# Patient Record
Sex: MALE | HISPANIC OR LATINO | URBAN - METROPOLITAN AREA
[De-identification: names, ages, dates, MRNs, and addresses within clinical notes are randomized per-mention and may not be internally consistent; named-entity substitution may affect disease eponyms.]

---

## 2017-05-10 ENCOUNTER — EMERGENCY (EMERGENCY)
Facility: HOSPITAL | Age: 32
LOS: 1 days | Discharge: HOME | End: 2017-05-10
Attending: EMERGENCY MEDICINE
Payer: COMMERCIAL

## 2017-05-10 VITALS
HEART RATE: 70 BPM | RESPIRATION RATE: 14 BRPM | OXYGEN SATURATION: 9 % | DIASTOLIC BLOOD PRESSURE: 79 MMHG | SYSTOLIC BLOOD PRESSURE: 129 MMHG

## 2017-05-10 VITALS
OXYGEN SATURATION: 98 % | HEIGHT: 63 IN | HEART RATE: 65 BPM | DIASTOLIC BLOOD PRESSURE: 70 MMHG | SYSTOLIC BLOOD PRESSURE: 136 MMHG | RESPIRATION RATE: 16 BRPM | WEIGHT: 139.99 LBS | TEMPERATURE: 99 F

## 2017-05-10 LAB
ACETONE SERPL-MCNC: NEGATIVE — SIGNIFICANT CHANGE UP
ALBUMIN SERPL ELPH-MCNC: 4 G/DL — SIGNIFICANT CHANGE UP (ref 3.5–5)
ALP SERPL-CCNC: 52 U/L — SIGNIFICANT CHANGE UP (ref 40–120)
ALT FLD-CCNC: 20 U/L DA — SIGNIFICANT CHANGE UP (ref 10–60)
ANION GAP SERPL CALC-SCNC: 6 MMOL/L — SIGNIFICANT CHANGE UP (ref 5–17)
APTT BLD: 32.7 SEC — SIGNIFICANT CHANGE UP (ref 27.5–37.4)
AST SERPL-CCNC: 22 U/L — SIGNIFICANT CHANGE UP (ref 10–40)
BASOPHILS NFR BLD AUTO: 2 % — SIGNIFICANT CHANGE UP (ref 0–2)
BILIRUB SERPL-MCNC: 0.3 MG/DL — SIGNIFICANT CHANGE UP (ref 0.2–1.2)
BUN SERPL-MCNC: 13 MG/DL — SIGNIFICANT CHANGE UP (ref 7–18)
CALCIUM SERPL-MCNC: 8.7 MG/DL — SIGNIFICANT CHANGE UP (ref 8.4–10.5)
CHLORIDE SERPL-SCNC: 106 MMOL/L — SIGNIFICANT CHANGE UP (ref 96–108)
CO2 SERPL-SCNC: 29 MMOL/L — SIGNIFICANT CHANGE UP (ref 22–31)
CREAT SERPL-MCNC: 0.84 MG/DL — SIGNIFICANT CHANGE UP (ref 0.5–1.3)
EOSINOPHIL NFR BLD AUTO: 5 % — SIGNIFICANT CHANGE UP (ref 0–6)
GLUCOSE SERPL-MCNC: 80 MG/DL — SIGNIFICANT CHANGE UP (ref 70–99)
HCT VFR BLD CALC: 46.1 % — SIGNIFICANT CHANGE UP (ref 39–50)
HGB BLD-MCNC: 16.2 G/DL — SIGNIFICANT CHANGE UP (ref 13–17)
INR BLD: 1.12 RATIO — SIGNIFICANT CHANGE UP (ref 0.88–1.16)
LACTATE SERPL-SCNC: 0.5 MMOL/L — LOW (ref 0.7–2)
LYMPHOCYTES # BLD AUTO: 37 % — SIGNIFICANT CHANGE UP (ref 13–44)
MAGNESIUM SERPL-MCNC: 2.2 MG/DL — SIGNIFICANT CHANGE UP (ref 1.8–2.4)
MANUAL DIF COMMENT BLD-IMP: SIGNIFICANT CHANGE UP
MCHC RBC-ENTMCNC: 31.5 PG — SIGNIFICANT CHANGE UP (ref 27–34)
MCHC RBC-ENTMCNC: 35.1 GM/DL — SIGNIFICANT CHANGE UP (ref 32–36)
MCV RBC AUTO: 89.8 FL — SIGNIFICANT CHANGE UP (ref 80–100)
MONOCYTES NFR BLD AUTO: 10 % — SIGNIFICANT CHANGE UP (ref 2–14)
NEUTROPHILS NFR BLD AUTO: 45 % — SIGNIFICANT CHANGE UP (ref 43–77)
PLAT MORPH BLD: NORMAL — SIGNIFICANT CHANGE UP
PLATELET # BLD AUTO: 252 K/UL — SIGNIFICANT CHANGE UP (ref 150–400)
POTASSIUM SERPL-MCNC: 3.7 MMOL/L — SIGNIFICANT CHANGE UP (ref 3.5–5.3)
POTASSIUM SERPL-SCNC: 3.7 MMOL/L — SIGNIFICANT CHANGE UP (ref 3.5–5.3)
PROT SERPL-MCNC: 7.8 G/DL — SIGNIFICANT CHANGE UP (ref 6–8.3)
PROTHROM AB SERPL-ACNC: 12.2 SEC — SIGNIFICANT CHANGE UP (ref 9.8–12.7)
RBC # BLD: 5.13 M/UL — SIGNIFICANT CHANGE UP (ref 4.2–5.8)
RBC # FLD: 11 % — SIGNIFICANT CHANGE UP (ref 10.3–14.5)
RBC BLD AUTO: NORMAL — SIGNIFICANT CHANGE UP
SODIUM SERPL-SCNC: 141 MMOL/L — SIGNIFICANT CHANGE UP (ref 135–145)
VARIANT LYMPHS # BLD: 1 % — SIGNIFICANT CHANGE UP (ref 0–6)
WBC # BLD: 6.4 K/UL — SIGNIFICANT CHANGE UP (ref 3.8–10.5)
WBC # FLD AUTO: 6.4 K/UL — SIGNIFICANT CHANGE UP (ref 3.8–10.5)

## 2017-05-10 PROCEDURE — 94640 AIRWAY INHALATION TREATMENT: CPT

## 2017-05-10 PROCEDURE — 83605 ASSAY OF LACTIC ACID: CPT

## 2017-05-10 PROCEDURE — 85027 COMPLETE CBC AUTOMATED: CPT

## 2017-05-10 PROCEDURE — 83735 ASSAY OF MAGNESIUM: CPT

## 2017-05-10 PROCEDURE — 82009 KETONE BODYS QUAL: CPT

## 2017-05-10 PROCEDURE — 99285 EMERGENCY DEPT VISIT HI MDM: CPT

## 2017-05-10 PROCEDURE — 85730 THROMBOPLASTIN TIME PARTIAL: CPT

## 2017-05-10 PROCEDURE — 99284 EMERGENCY DEPT VISIT MOD MDM: CPT | Mod: 25

## 2017-05-10 PROCEDURE — 87040 BLOOD CULTURE FOR BACTERIA: CPT

## 2017-05-10 PROCEDURE — 71020: CPT | Mod: 26

## 2017-05-10 PROCEDURE — 71046 X-RAY EXAM CHEST 2 VIEWS: CPT

## 2017-05-10 PROCEDURE — 96374 THER/PROPH/DIAG INJ IV PUSH: CPT

## 2017-05-10 PROCEDURE — 80053 COMPREHEN METABOLIC PANEL: CPT

## 2017-05-10 PROCEDURE — 96375 TX/PRO/DX INJ NEW DRUG ADDON: CPT

## 2017-05-10 PROCEDURE — 85610 PROTHROMBIN TIME: CPT

## 2017-05-10 RX ORDER — ALBUTEROL 90 UG/1
2.5 AEROSOL, METERED ORAL ONCE
Qty: 0 | Refills: 0 | Status: COMPLETED | OUTPATIENT
Start: 2017-05-10 | End: 2017-05-10

## 2017-05-10 RX ORDER — SODIUM CHLORIDE 9 MG/ML
2000 INJECTION INTRAMUSCULAR; INTRAVENOUS; SUBCUTANEOUS ONCE
Qty: 0 | Refills: 0 | Status: COMPLETED | OUTPATIENT
Start: 2017-05-10 | End: 2017-05-10

## 2017-05-10 RX ORDER — KETOROLAC TROMETHAMINE 30 MG/ML
30 SYRINGE (ML) INJECTION ONCE
Qty: 0 | Refills: 0 | Status: DISCONTINUED | OUTPATIENT
Start: 2017-05-10 | End: 2017-05-10

## 2017-05-10 RX ORDER — IBUPROFEN 200 MG
1 TABLET ORAL
Qty: 21 | Refills: 0 | OUTPATIENT
Start: 2017-05-10 | End: 2017-05-17

## 2017-05-10 RX ADMIN — Medication 125 MILLIGRAM(S): at 16:56

## 2017-05-10 RX ADMIN — Medication 30 MILLIGRAM(S): at 16:56

## 2017-05-10 RX ADMIN — SODIUM CHLORIDE 4000 MILLILITER(S): 9 INJECTION INTRAMUSCULAR; INTRAVENOUS; SUBCUTANEOUS at 16:16

## 2017-05-10 RX ADMIN — ALBUTEROL 2.5 MILLIGRAM(S): 90 AEROSOL, METERED ORAL at 16:57

## 2017-05-10 NOTE — ED PROVIDER NOTE - OBJECTIVE STATEMENT
30 y/o male with no PMHx presents to the ED c/o onset generalized weakness and cough x 4 days. Pt states that he was at work 4 days ago he started to having sneezing and mild fever and then yesterday developed coughing with productive yellowish sputum. Pt states that his fever has improved, but this morning, pt notes coughing up blood clot x 2 episodes. Pt took Theraflu to no relief (last does: last night). Pt then felt mild SOB, notes that he heard "whistling noise" with cough since onset. Pt denies fever, chills. nasal congestion, nausea, vomiting, recent travel, or any other complaints. NKDA. Pt is a light marijuana smoker. Pt has not gotten flu shot this year.

## 2017-05-10 NOTE — ED PROVIDER NOTE - NS ED MD SCRIBE ATTENDING SCRIBE SECTIONS
HIV/DISPOSITION/REVIEW OF SYSTEMS/HISTORY OF PRESENT ILLNESS/PAST MEDICAL/SURGICAL/SOCIAL HISTORY/PHYSICAL EXAM/VITAL SIGNS( Pullset)

## 2017-05-10 NOTE — ED ADULT TRIAGE NOTE - CHIEF COMPLAINT QUOTE
C/o I have a fever, dizziness, no energy. sneezing and I coughed up a large blood clot this morning.

## 2017-05-14 DIAGNOSIS — R05 COUGH: ICD-10-CM

## 2017-05-14 DIAGNOSIS — R53.1 WEAKNESS: ICD-10-CM

## 2017-05-14 DIAGNOSIS — F12.10 CANNABIS ABUSE, UNCOMPLICATED: ICD-10-CM

## 2017-05-14 DIAGNOSIS — B34.9 VIRAL INFECTION, UNSPECIFIED: ICD-10-CM

## 2017-05-16 LAB
CULTURE RESULTS: SIGNIFICANT CHANGE UP
CULTURE RESULTS: SIGNIFICANT CHANGE UP
SPECIMEN SOURCE: SIGNIFICANT CHANGE UP
SPECIMEN SOURCE: SIGNIFICANT CHANGE UP

## 2017-10-31 ENCOUNTER — INPATIENT (INPATIENT)
Facility: HOSPITAL | Age: 32
LOS: 1 days | Discharge: HOME | DRG: 340 | End: 2017-11-02
Attending: SURGERY | Admitting: SURGERY
Payer: SELF-PAY

## 2017-10-31 VITALS
RESPIRATION RATE: 18 BRPM | HEART RATE: 52 BPM | HEIGHT: 65 IN | DIASTOLIC BLOOD PRESSURE: 81 MMHG | TEMPERATURE: 98 F | WEIGHT: 160.06 LBS | OXYGEN SATURATION: 98 % | SYSTOLIC BLOOD PRESSURE: 135 MMHG

## 2017-10-31 LAB
ALBUMIN SERPL ELPH-MCNC: 3.9 G/DL — SIGNIFICANT CHANGE UP (ref 3.5–5)
ALP SERPL-CCNC: 46 U/L — SIGNIFICANT CHANGE UP (ref 40–120)
ALT FLD-CCNC: 22 U/L DA — SIGNIFICANT CHANGE UP (ref 10–60)
ANION GAP SERPL CALC-SCNC: 5 MMOL/L — SIGNIFICANT CHANGE UP (ref 5–17)
APPEARANCE UR: CLEAR — SIGNIFICANT CHANGE UP
AST SERPL-CCNC: 26 U/L — SIGNIFICANT CHANGE UP (ref 10–40)
BASOPHILS # BLD AUTO: 0.1 K/UL — SIGNIFICANT CHANGE UP (ref 0–0.2)
BASOPHILS NFR BLD AUTO: 1.5 % — SIGNIFICANT CHANGE UP (ref 0–2)
BILIRUB SERPL-MCNC: 0.3 MG/DL — SIGNIFICANT CHANGE UP (ref 0.2–1.2)
BILIRUB UR-MCNC: NEGATIVE — SIGNIFICANT CHANGE UP
BUN SERPL-MCNC: 16 MG/DL — SIGNIFICANT CHANGE UP (ref 7–18)
CALCIUM SERPL-MCNC: 8.7 MG/DL — SIGNIFICANT CHANGE UP (ref 8.4–10.5)
CHLORIDE SERPL-SCNC: 105 MMOL/L — SIGNIFICANT CHANGE UP (ref 96–108)
CO2 SERPL-SCNC: 29 MMOL/L — SIGNIFICANT CHANGE UP (ref 22–31)
COLOR SPEC: YELLOW — SIGNIFICANT CHANGE UP
CREAT SERPL-MCNC: 0.82 MG/DL — SIGNIFICANT CHANGE UP (ref 0.5–1.3)
DIFF PNL FLD: ABNORMAL
EOSINOPHIL # BLD AUTO: 0.2 K/UL — SIGNIFICANT CHANGE UP (ref 0–0.5)
EOSINOPHIL NFR BLD AUTO: 1.6 % — SIGNIFICANT CHANGE UP (ref 0–6)
GLUCOSE SERPL-MCNC: 88 MG/DL — SIGNIFICANT CHANGE UP (ref 70–99)
GLUCOSE UR QL: NEGATIVE — SIGNIFICANT CHANGE UP
HCT VFR BLD CALC: 47.6 % — SIGNIFICANT CHANGE UP (ref 39–50)
HGB BLD-MCNC: 16.2 G/DL — SIGNIFICANT CHANGE UP (ref 13–17)
KETONES UR-MCNC: NEGATIVE — SIGNIFICANT CHANGE UP
LEUKOCYTE ESTERASE UR-ACNC: NEGATIVE — SIGNIFICANT CHANGE UP
LIDOCAIN IGE QN: 95 U/L — SIGNIFICANT CHANGE UP (ref 73–393)
LYMPHOCYTES # BLD AUTO: 1.6 K/UL — SIGNIFICANT CHANGE UP (ref 1–3.3)
LYMPHOCYTES # BLD AUTO: 16.5 % — SIGNIFICANT CHANGE UP (ref 13–44)
MCHC RBC-ENTMCNC: 31.8 PG — SIGNIFICANT CHANGE UP (ref 27–34)
MCHC RBC-ENTMCNC: 34 GM/DL — SIGNIFICANT CHANGE UP (ref 32–36)
MCV RBC AUTO: 93.5 FL — SIGNIFICANT CHANGE UP (ref 80–100)
MONOCYTES # BLD AUTO: 1 K/UL — HIGH (ref 0–0.9)
MONOCYTES NFR BLD AUTO: 10.2 % — SIGNIFICANT CHANGE UP (ref 2–14)
NEUTROPHILS # BLD AUTO: 6.9 K/UL — SIGNIFICANT CHANGE UP (ref 1.8–7.4)
NEUTROPHILS NFR BLD AUTO: 70.2 % — SIGNIFICANT CHANGE UP (ref 43–77)
NITRITE UR-MCNC: NEGATIVE — SIGNIFICANT CHANGE UP
PH UR: 6 — SIGNIFICANT CHANGE UP (ref 5–8)
PLATELET # BLD AUTO: 273 K/UL — SIGNIFICANT CHANGE UP (ref 150–400)
POTASSIUM SERPL-MCNC: 3.9 MMOL/L — SIGNIFICANT CHANGE UP (ref 3.5–5.3)
POTASSIUM SERPL-SCNC: 3.9 MMOL/L — SIGNIFICANT CHANGE UP (ref 3.5–5.3)
PROT SERPL-MCNC: 7.6 G/DL — SIGNIFICANT CHANGE UP (ref 6–8.3)
PROT UR-MCNC: NEGATIVE — SIGNIFICANT CHANGE UP
RBC # BLD: 5.09 M/UL — SIGNIFICANT CHANGE UP (ref 4.2–5.8)
RBC # FLD: 11.5 % — SIGNIFICANT CHANGE UP (ref 10.3–14.5)
SODIUM SERPL-SCNC: 139 MMOL/L — SIGNIFICANT CHANGE UP (ref 135–145)
SP GR SPEC: 1.02 — SIGNIFICANT CHANGE UP (ref 1.01–1.02)
UROBILINOGEN FLD QL: 4
WBC # BLD: 9.8 K/UL — SIGNIFICANT CHANGE UP (ref 3.8–10.5)
WBC # FLD AUTO: 9.8 K/UL — SIGNIFICANT CHANGE UP (ref 3.8–10.5)

## 2017-10-31 RX ORDER — SODIUM CHLORIDE 9 MG/ML
1000 INJECTION INTRAMUSCULAR; INTRAVENOUS; SUBCUTANEOUS ONCE
Qty: 0 | Refills: 0 | Status: COMPLETED | OUTPATIENT
Start: 2017-10-31 | End: 2017-10-31

## 2017-10-31 RX ORDER — ONDANSETRON 8 MG/1
4 TABLET, FILM COATED ORAL ONCE
Qty: 0 | Refills: 0 | Status: COMPLETED | OUTPATIENT
Start: 2017-10-31 | End: 2017-10-31

## 2017-10-31 RX ORDER — SODIUM CHLORIDE 9 MG/ML
3 INJECTION INTRAMUSCULAR; INTRAVENOUS; SUBCUTANEOUS ONCE
Qty: 0 | Refills: 0 | Status: COMPLETED | OUTPATIENT
Start: 2017-10-31 | End: 2017-10-31

## 2017-10-31 RX ADMIN — SODIUM CHLORIDE 2000 MILLILITER(S): 9 INJECTION INTRAMUSCULAR; INTRAVENOUS; SUBCUTANEOUS at 22:37

## 2017-10-31 RX ADMIN — SODIUM CHLORIDE 1000 MILLILITER(S): 9 INJECTION INTRAMUSCULAR; INTRAVENOUS; SUBCUTANEOUS at 21:11

## 2017-10-31 RX ADMIN — ONDANSETRON 4 MILLIGRAM(S): 8 TABLET, FILM COATED ORAL at 22:37

## 2017-10-31 RX ADMIN — SODIUM CHLORIDE 3 MILLILITER(S): 9 INJECTION INTRAMUSCULAR; INTRAVENOUS; SUBCUTANEOUS at 21:10

## 2017-10-31 NOTE — ED ADULT NURSE NOTE - OBJECTIVE STATEMENT
33 y/o M pt with no significant PMHx or PSHx presented to the ED c/o nausea and abd pain for the last 2 days. Pt reported that he went to [facility]; upon discovery of RLQ pain, physician instructed him to report to ED query appendicitis. P/S 0-9/10.  Pt denies any other complaints

## 2017-10-31 NOTE — ED PROVIDER NOTE - OBJECTIVE STATEMENT
33 y/o M pt with no significant PMHx or PSHx presented to the ED c/o nausea and abd pain for the last 2 days. Pt reported that he went to [facility]; upon discovery of RLQ pain, physician instructed him to report to ED. Pt denies any other complaints. NKDA.

## 2017-10-31 NOTE — ED PROVIDER NOTE - MEDICAL DECISION MAKING DETAILS
Patient with RLQ pain, will get labs, CT abdomen/pelvis. ddx: appendicitis, colitis, diverticulitis, bowel obstruction

## 2017-11-01 ENCOUNTER — TRANSCRIPTION ENCOUNTER (OUTPATIENT)
Age: 32
End: 2017-11-01

## 2017-11-01 ENCOUNTER — RESULT REVIEW (OUTPATIENT)
Age: 32
End: 2017-11-01

## 2017-11-01 DIAGNOSIS — K35.89 OTHER ACUTE APPENDICITIS: ICD-10-CM

## 2017-11-01 DIAGNOSIS — K37 UNSPECIFIED APPENDICITIS: ICD-10-CM

## 2017-11-01 DIAGNOSIS — K35.80 UNSPECIFIED ACUTE APPENDICITIS: ICD-10-CM

## 2017-11-01 LAB
ANION GAP SERPL CALC-SCNC: 8 MMOL/L — SIGNIFICANT CHANGE UP (ref 5–17)
BLD GP AB SCN SERPL QL: SIGNIFICANT CHANGE UP
BUN SERPL-MCNC: 12 MG/DL — SIGNIFICANT CHANGE UP (ref 7–18)
CALCIUM SERPL-MCNC: 8 MG/DL — LOW (ref 8.4–10.5)
CHLORIDE SERPL-SCNC: 107 MMOL/L — SIGNIFICANT CHANGE UP (ref 96–108)
CO2 SERPL-SCNC: 25 MMOL/L — SIGNIFICANT CHANGE UP (ref 22–31)
CREAT SERPL-MCNC: 0.86 MG/DL — SIGNIFICANT CHANGE UP (ref 0.5–1.3)
GLUCOSE SERPL-MCNC: 94 MG/DL — SIGNIFICANT CHANGE UP (ref 70–99)
INR BLD: 1.06 RATIO — SIGNIFICANT CHANGE UP (ref 0.88–1.16)
POTASSIUM SERPL-MCNC: 3.6 MMOL/L — SIGNIFICANT CHANGE UP (ref 3.5–5.3)
POTASSIUM SERPL-SCNC: 3.6 MMOL/L — SIGNIFICANT CHANGE UP (ref 3.5–5.3)
PROTHROM AB SERPL-ACNC: 11.6 SEC — SIGNIFICANT CHANGE UP (ref 9.8–12.7)
SODIUM SERPL-SCNC: 140 MMOL/L — SIGNIFICANT CHANGE UP (ref 135–145)

## 2017-11-01 PROCEDURE — 99223 1ST HOSP IP/OBS HIGH 75: CPT | Mod: 57

## 2017-11-01 PROCEDURE — 99285 EMERGENCY DEPT VISIT HI MDM: CPT | Mod: 25

## 2017-11-01 PROCEDURE — 88304 TISSUE EXAM BY PATHOLOGIST: CPT | Mod: 26

## 2017-11-01 PROCEDURE — 44970 LAPAROSCOPY APPENDECTOMY: CPT

## 2017-11-01 PROCEDURE — 99053 MED SERV 10PM-8AM 24 HR FAC: CPT

## 2017-11-01 PROCEDURE — 44970 LAPAROSCOPY APPENDECTOMY: CPT | Mod: AS

## 2017-11-01 PROCEDURE — 74177 CT ABD & PELVIS W/CONTRAST: CPT | Mod: 26

## 2017-11-01 RX ORDER — DEXTROSE MONOHYDRATE, SODIUM CHLORIDE, AND POTASSIUM CHLORIDE 50; .745; 4.5 G/1000ML; G/1000ML; G/1000ML
1000 INJECTION, SOLUTION INTRAVENOUS
Qty: 0 | Refills: 0 | Status: DISCONTINUED | OUTPATIENT
Start: 2017-11-01 | End: 2017-11-01

## 2017-11-01 RX ORDER — HEPARIN SODIUM 5000 [USP'U]/ML
5000 INJECTION INTRAVENOUS; SUBCUTANEOUS EVERY 8 HOURS
Qty: 0 | Refills: 0 | Status: DISCONTINUED | OUTPATIENT
Start: 2017-11-01 | End: 2017-11-02

## 2017-11-01 RX ORDER — CEFOTETAN DISODIUM 1 G
1 VIAL (EA) INJECTION ONCE
Qty: 0 | Refills: 0 | Status: COMPLETED | OUTPATIENT
Start: 2017-11-01 | End: 2017-11-01

## 2017-11-01 RX ORDER — MORPHINE SULFATE 50 MG/1
2 CAPSULE, EXTENDED RELEASE ORAL EVERY 4 HOURS
Qty: 0 | Refills: 0 | Status: DISCONTINUED | OUTPATIENT
Start: 2017-11-01 | End: 2017-11-01

## 2017-11-01 RX ORDER — ACETAMINOPHEN 500 MG
650 TABLET ORAL EVERY 6 HOURS
Qty: 0 | Refills: 0 | Status: DISCONTINUED | OUTPATIENT
Start: 2017-11-01 | End: 2017-11-02

## 2017-11-01 RX ORDER — ONDANSETRON 8 MG/1
4 TABLET, FILM COATED ORAL EVERY 6 HOURS
Qty: 0 | Refills: 0 | Status: DISCONTINUED | OUTPATIENT
Start: 2017-11-01 | End: 2017-11-01

## 2017-11-01 RX ORDER — SODIUM CHLORIDE 9 MG/ML
1000 INJECTION, SOLUTION INTRAVENOUS
Qty: 0 | Refills: 0 | Status: DISCONTINUED | OUTPATIENT
Start: 2017-11-01 | End: 2017-11-02

## 2017-11-01 RX ORDER — KETOROLAC TROMETHAMINE 30 MG/ML
30 SYRINGE (ML) INJECTION EVERY 6 HOURS
Qty: 0 | Refills: 0 | Status: DISCONTINUED | OUTPATIENT
Start: 2017-11-01 | End: 2017-11-01

## 2017-11-01 RX ORDER — MORPHINE SULFATE 50 MG/1
4 CAPSULE, EXTENDED RELEASE ORAL EVERY 4 HOURS
Qty: 0 | Refills: 0 | Status: DISCONTINUED | OUTPATIENT
Start: 2017-11-01 | End: 2017-11-02

## 2017-11-01 RX ORDER — CEFOTETAN DISODIUM 1 G
1 VIAL (EA) INJECTION EVERY 12 HOURS
Qty: 0 | Refills: 0 | Status: DISCONTINUED | OUTPATIENT
Start: 2017-11-01 | End: 2017-11-01

## 2017-11-01 RX ORDER — OXYCODONE AND ACETAMINOPHEN 5; 325 MG/1; MG/1
1 TABLET ORAL EVERY 4 HOURS
Qty: 0 | Refills: 0 | Status: DISCONTINUED | OUTPATIENT
Start: 2017-11-01 | End: 2017-11-02

## 2017-11-01 RX ORDER — SODIUM CHLORIDE 9 MG/ML
1000 INJECTION, SOLUTION INTRAVENOUS
Qty: 0 | Refills: 0 | Status: DISCONTINUED | OUTPATIENT
Start: 2017-11-01 | End: 2017-11-01

## 2017-11-01 RX ORDER — ONDANSETRON 8 MG/1
4 TABLET, FILM COATED ORAL EVERY 6 HOURS
Qty: 0 | Refills: 0 | Status: DISCONTINUED | OUTPATIENT
Start: 2017-11-01 | End: 2017-11-02

## 2017-11-01 RX ADMIN — SODIUM CHLORIDE 125 MILLILITER(S): 9 INJECTION, SOLUTION INTRAVENOUS at 12:00

## 2017-11-01 RX ADMIN — Medication 120 GRAM(S): at 01:45

## 2017-11-01 RX ADMIN — DEXTROSE MONOHYDRATE, SODIUM CHLORIDE, AND POTASSIUM CHLORIDE 125 MILLILITER(S): 50; .745; 4.5 INJECTION, SOLUTION INTRAVENOUS at 02:17

## 2017-11-01 RX ADMIN — HEPARIN SODIUM 5000 UNIT(S): 5000 INJECTION INTRAVENOUS; SUBCUTANEOUS at 21:46

## 2017-11-01 RX ADMIN — HEPARIN SODIUM 5000 UNIT(S): 5000 INJECTION INTRAVENOUS; SUBCUTANEOUS at 14:07

## 2017-11-01 RX ADMIN — Medication 120 GRAM(S): at 05:05

## 2017-11-01 RX ADMIN — ONDANSETRON 4 MILLIGRAM(S): 8 TABLET, FILM COATED ORAL at 02:32

## 2017-11-01 NOTE — BRIEF OPERATIVE NOTE - PROCEDURE
<<-----Click on this checkbox to enter Procedure Laparoscopic appendectomy  11/01/2017    Active  ELEN

## 2017-11-01 NOTE — PROGRESS NOTE ADULT - SUBJECTIVE AND OBJECTIVE BOX
SUBJECTIVE    Nausea: [ ] YES [ X] NO           Vomiting: [ ] YES [ X] NO  Flatus: [ ] YES [X ] NO             Bowel Movement: [ ] YES [X ] NO            Diarrhea: [ ] YES [ ] NO  Voiding normally: [ ]YES [X ]NO--mcallister in place  Pain under control: [ X] YES [ ] NO  NPO/NGT  Ambulated: [ ] YES [X ]NO  remains intubated    VITALS  Vital Signs Last 24 Hrs  T(C): 36.5 (01 Nov 2017 06:03), Max: 36.9 (01 Nov 2017 00:41)  T(F): 97.7 (01 Nov 2017 06:03), Max: 98.5 (01 Nov 2017 00:41)  HR: 51 (01 Nov 2017 06:03) (51 - 58)  BP: 109/54 (01 Nov 2017 06:03) (109/54 - 135/81)  BP(mean): --  RR: 17 (01 Nov 2017 06:03) (16 - 18)  SpO2: 99% (01 Nov 2017 06:03) (98% - 99%)    I&O's Summary      PHYSICAL EXAMINATION    Abdomen: softly distended; appropriately tender; incision packed; stoma dusky but viable no necrotic;  OBED serosanguinous      LABS                        16.2   9.8   )-----------( 273      ( 31 Oct 2017 21:19 )             47.6     11-01    140  |  107  |  12  ----------------------------<  94  3.6   |  25  |  0.86    Ca    8.0<L>      01 Nov 2017 05:28    TPro  7.6  /  Alb  3.9  /  TBili  0.3  /  DBili  x   /  AST  26  /  ALT  22  /  AlkPhos  46  10-31     LIVER FUNCTIONS - ( 31 Oct 2017 21:19 )  Alb: 3.9 g/dL / Pro: 7.6 g/dL / ALK PHOS: 46 U/L / ALT: 22 U/L DA / AST: 26 U/L / GGT: x             MEDICATIONS  MEDICATIONS  (STANDING):  dextrose 5% + sodium chloride 0.9%. 1000 milliLiter(s) (100 mL/Hr) IV Continuous <Continuous>  heparin  Injectable 5000 Unit(s) SubCutaneous every 8 hours    MEDICATIONS  (PRN):  acetaminophen   Tablet 650 milliGRAM(s) Oral every 6 hours PRN For Temp greater than 38.5 C (101.3 F)  morphine  - Injectable 4 milliGRAM(s) IV Push every 4 hours PRN Severe Pain  ondansetron Injectable 4 milliGRAM(s) IV Push every 6 hours PRN Nausea  oxyCODONE    5 mG/acetaminophen 325 mG 1 Tablet(s) Oral every 4 hours PRN Moderate Pain

## 2017-11-01 NOTE — PROGRESS NOTE ADULT - SUBJECTIVE AND OBJECTIVE BOX
Surgery Post-Op Note    Pt s/p lap appendectomy, seen and examined at bedside. Admits to incisional pain, no nausea, vomiting. No SOB or CP. On clear diet, tolerating well.       Vital Signs Last 24 Hrs  T(C): 36.6 (01 Nov 2017 12:17), Max: 36.9 (01 Nov 2017 00:41)  T(F): 97.9 (01 Nov 2017 12:17), Max: 98.5 (01 Nov 2017 00:41)  HR: 61 (01 Nov 2017 12:17) (51 - 67)  BP: 121/56 (01 Nov 2017 12:17) (108/61 - 135/81)  BP(mean): 85 (01 Nov 2017 11:54) (75 - 93)  RR: 16 (01 Nov 2017 12:17) (16 - 21)  SpO2: 98% (01 Nov 2017 12:17) (97% - 100%)    Physical Exam:   GEN: AAOx3, No acute distress  ABD: Soft, ND, incisional TTP, dressing C/D/I   Extremities: non edematous, no calf pain bilaterally    Drains/tubes:      11-01 @ 07:01  -  11-01 @ 18:13  --------------------------------------------------------  IN: 1440 mL / OUT: 275 mL / NET: 1165 mL

## 2017-11-01 NOTE — PROGRESS NOTE ADULT - ASSESSMENT
32M s/p ex-lap and Kerrie's colostomy. Stable. Bolus being given for low UO; intubated no pressors.    1) f/u labs  2) abx  3) vent management per SICU

## 2017-11-01 NOTE — H&P ADULT - HISTORY OF PRESENT ILLNESS
33 y/o male denies sig PMH/PSH; c/o abd bloating, upper abd discomfort, nausea since Mon  now pt with pain localized more to RLQ today  CT done in ED suspicious for acute appy

## 2017-11-01 NOTE — PROGRESS NOTE ADULT - SUBJECTIVE AND OBJECTIVE BOX
Surgery Attending    32M with RLQ abdominal pain with appendicitis. Tender on exam to RLQ. For OR today, risks, benefits alternatives discussed. All questions answered. Agrees to proceed

## 2017-11-01 NOTE — PROGRESS NOTE ADULT - SUBJECTIVE AND OBJECTIVE BOX
POSTOP NOTE    Patient is doing okay.        Vital Signs Last 24 Hrs  T(C): 36.6 (2017 12:17), Max: 36.9 (2017 00:41)  T(F): 97.9 (2017 12:17), Max: 98.5 (2017 00:41)  HR: 61 (2017 12:17) (51 - 67)  BP: 121/56 (2017 12:17) (108/61 - 128/79)  BP(mean): 85 (2017 11:54) (75 - 93)  RR: 16 (2017 12:17) (16 - 21)  SpO2: 98% (2017 12:17) (97% - 100%)    Daily Height in cm: 165.1 (2017 03:16)    Daily     I&O's Detail    2017 07:01  -  2017 22:19  --------------------------------------------------------  IN:    Lactated Ringers IV Bolus: 1250 mL    lactated ringers.: 190 mL  Total IN: 1440 mL    OUT:    Voided: 275 mL  Total OUT: 275 mL    Total NET: 1165 mL          MEDICATIONS  (STANDING):  dextrose 5% + sodium chloride 0.9%. 1000 milliLiter(s) (100 mL/Hr) IV Continuous <Continuous>  heparin  Injectable 5000 Unit(s) SubCutaneous every 8 hours    MEDICATIONS  (PRN):  acetaminophen   Tablet 650 milliGRAM(s) Oral every 6 hours PRN For Temp greater than 38.5 C (101.3 F)  morphine  - Injectable 4 milliGRAM(s) IV Push every 4 hours PRN Severe Pain  ondansetron Injectable 4 milliGRAM(s) IV Push every 6 hours PRN Nausea  oxyCODONE    5 mG/acetaminophen 325 mG 1 Tablet(s) Oral every 4 hours PRN Moderate Pain      PHYSICAL EXAM:  GENERAL: In no acute distress  CHEST/LUNG: Clear to percussion bilaterally; Normal respiratory effort  HEART: Regular rate and rhythm  ABDOMEN: Soft, Dressing C/D/I  EXTREMITIES:  No clubbing, cyanosis, or edema    LABS:                        16.2   9.8   )-----------( 273      ( 31 Oct 2017 21:19 )             47.6     Neutrophil %:       140  |  107  |  12  ----------------------------<  94  3.6   |  25  |  0.86    Ca    8.0<L>      2017 05:28    TPro  7.6  /  Alb  3.9  /  TBili  0.3  /  DBili  x   /  AST  26  /  ALT  22  /  AlkPhos  46  10-31    PT/INR - ( 2017 02:49 )   PT: 11.6 sec;   INR: 1.06 ratio           Urinalysis Basic - ( 31 Oct 2017 23:00 )    Color: Yellow / Appearance: Clear / S.020 / pH: x  Gluc: x / Ketone: Negative  / Bili: Negative / Urobili: 4   Blood: x / Protein: Negative / Nitrite: Negative   Leuk Esterase: Negative / RBC: 10-25 /HPF / WBC 0-2 /HPF   Sq Epi: x / Non Sq Epi: Occasional /HPF / Bacteria: Trace /HPF        RADIOLOGY & ADDITIONAL STUDIES:

## 2017-11-01 NOTE — PROGRESS NOTE ADULT - ASSESSMENT
A/P:32y Male s/p lap appendectomy, stable       -Pain control PRN  -IVF  -Clear Diet, advance to regular in AM  -Incentive spirometer  -OOB/Ambulate  -DVT ppx   -Dc home in AM

## 2017-11-02 ENCOUNTER — TRANSCRIPTION ENCOUNTER (OUTPATIENT)
Age: 32
End: 2017-11-02

## 2017-11-02 VITALS
TEMPERATURE: 98 F | DIASTOLIC BLOOD PRESSURE: 65 MMHG | HEART RATE: 57 BPM | SYSTOLIC BLOOD PRESSURE: 122 MMHG | OXYGEN SATURATION: 99 % | RESPIRATION RATE: 16 BRPM

## 2017-11-02 LAB
ANION GAP SERPL CALC-SCNC: 8 MMOL/L — SIGNIFICANT CHANGE UP (ref 5–17)
BASOPHILS # BLD AUTO: 0.1 K/UL — SIGNIFICANT CHANGE UP (ref 0–0.2)
BASOPHILS NFR BLD AUTO: 0.9 % — SIGNIFICANT CHANGE UP (ref 0–2)
BUN SERPL-MCNC: 6 MG/DL — LOW (ref 7–18)
CALCIUM SERPL-MCNC: 8.7 MG/DL — SIGNIFICANT CHANGE UP (ref 8.4–10.5)
CHLORIDE SERPL-SCNC: 104 MMOL/L — SIGNIFICANT CHANGE UP (ref 96–108)
CO2 SERPL-SCNC: 28 MMOL/L — SIGNIFICANT CHANGE UP (ref 22–31)
CREAT SERPL-MCNC: 0.76 MG/DL — SIGNIFICANT CHANGE UP (ref 0.5–1.3)
EOSINOPHIL # BLD AUTO: 0.1 K/UL — SIGNIFICANT CHANGE UP (ref 0–0.5)
EOSINOPHIL NFR BLD AUTO: 0.9 % — SIGNIFICANT CHANGE UP (ref 0–6)
GLUCOSE SERPL-MCNC: 85 MG/DL — SIGNIFICANT CHANGE UP (ref 70–99)
HCT VFR BLD CALC: 44.7 % — SIGNIFICANT CHANGE UP (ref 39–50)
HGB BLD-MCNC: 15.3 G/DL — SIGNIFICANT CHANGE UP (ref 13–17)
LYMPHOCYTES # BLD AUTO: 25.7 % — SIGNIFICANT CHANGE UP (ref 13–44)
LYMPHOCYTES # BLD AUTO: 3 K/UL — SIGNIFICANT CHANGE UP (ref 1–3.3)
MCHC RBC-ENTMCNC: 31.8 PG — SIGNIFICANT CHANGE UP (ref 27–34)
MCHC RBC-ENTMCNC: 34.2 GM/DL — SIGNIFICANT CHANGE UP (ref 32–36)
MCV RBC AUTO: 92.9 FL — SIGNIFICANT CHANGE UP (ref 80–100)
MONOCYTES # BLD AUTO: 1.1 K/UL — HIGH (ref 0–0.9)
MONOCYTES NFR BLD AUTO: 9.6 % — SIGNIFICANT CHANGE UP (ref 2–14)
NEUTROPHILS # BLD AUTO: 7.4 K/UL — SIGNIFICANT CHANGE UP (ref 1.8–7.4)
NEUTROPHILS NFR BLD AUTO: 62.9 % — SIGNIFICANT CHANGE UP (ref 43–77)
PLATELET # BLD AUTO: 242 K/UL — SIGNIFICANT CHANGE UP (ref 150–400)
POTASSIUM SERPL-MCNC: 3.4 MMOL/L — LOW (ref 3.5–5.3)
POTASSIUM SERPL-SCNC: 3.4 MMOL/L — LOW (ref 3.5–5.3)
RBC # BLD: 4.82 M/UL — SIGNIFICANT CHANGE UP (ref 4.2–5.8)
RBC # FLD: 11.2 % — SIGNIFICANT CHANGE UP (ref 10.3–14.5)
SODIUM SERPL-SCNC: 140 MMOL/L — SIGNIFICANT CHANGE UP (ref 135–145)
WBC # BLD: 11.8 K/UL — HIGH (ref 3.8–10.5)
WBC # FLD AUTO: 11.8 K/UL — HIGH (ref 3.8–10.5)

## 2017-11-02 PROCEDURE — 96374 THER/PROPH/DIAG INJ IV PUSH: CPT

## 2017-11-02 PROCEDURE — 86850 RBC ANTIBODY SCREEN: CPT

## 2017-11-02 PROCEDURE — 85027 COMPLETE CBC AUTOMATED: CPT

## 2017-11-02 PROCEDURE — 80053 COMPREHEN METABOLIC PANEL: CPT

## 2017-11-02 PROCEDURE — 86900 BLOOD TYPING SEROLOGIC ABO: CPT

## 2017-11-02 PROCEDURE — 99285 EMERGENCY DEPT VISIT HI MDM: CPT | Mod: 25

## 2017-11-02 PROCEDURE — 88304 TISSUE EXAM BY PATHOLOGIST: CPT

## 2017-11-02 PROCEDURE — 74177 CT ABD & PELVIS W/CONTRAST: CPT

## 2017-11-02 PROCEDURE — 80048 BASIC METABOLIC PNL TOTAL CA: CPT

## 2017-11-02 PROCEDURE — 85610 PROTHROMBIN TIME: CPT

## 2017-11-02 PROCEDURE — 81001 URINALYSIS AUTO W/SCOPE: CPT

## 2017-11-02 PROCEDURE — 86901 BLOOD TYPING SEROLOGIC RH(D): CPT

## 2017-11-02 PROCEDURE — 83690 ASSAY OF LIPASE: CPT

## 2017-11-02 RX ORDER — POTASSIUM CHLORIDE 20 MEQ
40 PACKET (EA) ORAL ONCE
Qty: 0 | Refills: 0 | Status: DISCONTINUED | OUTPATIENT
Start: 2017-11-02 | End: 2017-11-02

## 2017-11-02 RX ADMIN — HEPARIN SODIUM 5000 UNIT(S): 5000 INJECTION INTRAVENOUS; SUBCUTANEOUS at 05:16

## 2017-11-02 NOTE — PROGRESS NOTE ADULT - SUBJECTIVE AND OBJECTIVE BOX
31 y/o male s/p laparoscopic appendectomy POD # 1. Patient examined at bedside, no complaints.   No nausea, no vomiting  Tolerating diet    T(F): 98 (11-02-17 @ 05:45), Max: 98.5 (11-01-17 @ 11:54)  HR: 57 (11-02-17 @ 05:45) (56 - 72)  BP: 122/65 (11-02-17 @ 05:45) (108/61 - 129/72)  RR: 16 (11-02-17 @ 05:45) (16 - 21)  SpO2: 99% (11-02-17 @ 05:45) (97% - 100%)  Wt(kg): --      11-01 @ 07:01  -  11-02 @ 07:00  --------------------------------------------------------  IN:    Lactated Ringers IV Bolus: 1250 mL    lactated ringers.: 190 mL  Total IN: 1440 mL    OUT:    Voided: 275 mL  Total OUT: 275 mL    Total NET: 1165 mL                          15.3   11.8  )-----------( 242      ( 02 Nov 2017 06:15 )             44.7   11-02    140  |  104  |  6<L>  ----------------------------<  85  3.4<L>   |  28  |  0.76    Ca    8.7      02 Nov 2017 06:15    TPro  7.6  /  Alb  3.9  /  TBili  0.3  /  DBili  x   /  AST  26  /  ALT  22  /  AlkPhos  46  10-31            Physical Exam  General: AAOx3, No acute distress  Skin: No jaundice, no icterus  Abdomen: soft, periincisional tenderness  nondistended, no rebound tenderness, no guarding, no palpable masses  : Normal external genitalia  Extremities: non edematous, no calf pain bilaterally

## 2017-11-02 NOTE — PROGRESS NOTE ADULT - SUBJECTIVE AND OBJECTIVE BOX
SUBJECTIVE    Nausea: [ ] YES [X ] NO           Vomiting: [ ] YES [X ] NO  Flatus: [ ] YES [ ] NO               Voiding normally: [X ]YES [ ]NO  Pain under control: [ X] YES [ ] NO  Tolerating clears  Ambulated: [X ] YES [ ]NO  Using Incentive Spirometer: [X ] YES [ ] NO    VITALS  Vital Signs Last 24 Hrs  T(C): 36.7 (02 Nov 2017 05:45), Max: 36.9 (01 Nov 2017 11:54)  T(F): 98 (02 Nov 2017 05:45), Max: 98.5 (01 Nov 2017 11:54)  HR: 57 (02 Nov 2017 05:45) (56 - 72)  BP: 122/65 (02 Nov 2017 05:45) (108/61 - 129/72)  BP(mean): 85 (01 Nov 2017 11:54) (75 - 93)  RR: 16 (02 Nov 2017 05:45) (16 - 21)  SpO2: 99% (02 Nov 2017 05:45) (97% - 100%)    I&O's Summary    01 Nov 2017 07:01  -  02 Nov 2017 07:00  --------------------------------------------------------  IN: 1440 mL / OUT: 275 mL / NET: 1165 mL        PHYSICAL EXAMINATION    Abdomen: soft, ND, mildly tender to RLQ as expected      LABS                        15.3   11.8  )-----------( 242      ( 02 Nov 2017 06:15 )             44.7     11-02    140  |  104  |  6<L>  ----------------------------<  85  3.4<L>   |  28  |  0.76    Ca    8.7      02 Nov 2017 06:15    TPro  7.6  /  Alb  3.9  /  TBili  0.3  /  DBili  x   /  AST  26  /  ALT  22  /  AlkPhos  46  10-31     LIVER FUNCTIONS - ( 31 Oct 2017 21:19 )  Alb: 3.9 g/dL / Pro: 7.6 g/dL / ALK PHOS: 46 U/L / ALT: 22 U/L DA / AST: 26 U/L / GGT: x             MEDICATIONS  MEDICATIONS  (STANDING):  dextrose 5% + sodium chloride 0.9%. 1000 milliLiter(s) (100 mL/Hr) IV Continuous <Continuous>  heparin  Injectable 5000 Unit(s) SubCutaneous every 8 hours  potassium chloride    Tablet ER 40 milliEquivalent(s) Oral once    MEDICATIONS  (PRN):  acetaminophen   Tablet 650 milliGRAM(s) Oral every 6 hours PRN For Temp greater than 38.5 C (101.3 F)  morphine  - Injectable 4 milliGRAM(s) IV Push every 4 hours PRN Severe Pain  ondansetron Injectable 4 milliGRAM(s) IV Push every 6 hours PRN Nausea  oxyCODONE    5 mG/acetaminophen 325 mG 1 Tablet(s) Oral every 4 hours PRN Moderate Pain

## 2017-11-02 NOTE — DISCHARGE NOTE ADULT - HOSPITAL COURSE
33 y/o male denies sig PMH/PSH; c/o abd bloating, upper abd discomfort, nausea since Mon  now pt with pain localized more to RLQ today  CT done in ED suspicious for acute appy  Patient was taken to the OR on 11/1 and underwent laparoscopic appendectomy. Patient tolerated procedure well. Patient for discharge home with follow up with Dr. Lizama.

## 2017-11-02 NOTE — DISCHARGE NOTE ADULT - CARE PLAN
Principal Discharge DX:	Acute appendicitis, unspecified acute appendicitis type  Goal:	wound healing  Instructions for follow-up, activity and diet:	Please follow up with Dr. Fairbanks within 1 week   no heavy lifting or straining   Diet as tolerated

## 2017-11-02 NOTE — DISCHARGE NOTE ADULT - PLAN OF CARE
wound healing Please follow up with Dr. Fairbanks within 1 week   no heavy lifting or straining   Diet as tolerated

## 2017-11-02 NOTE — DISCHARGE NOTE ADULT - PATIENT PORTAL LINK FT
“You can access the [facility] Patient Portal, offered by [facility], by registering with the following website: [email or web address]”

## 2017-11-02 NOTE — PROGRESS NOTE ADULT - PROBLEM SELECTOR PROBLEM 1
Acute appendicitis, unspecified acute appendicitis type
Acute appendicitis, unspecified acute appendicitis type

## 2017-11-02 NOTE — DISCHARGE NOTE ADULT - MEDICATION SUMMARY - MEDICATIONS TO TAKE
I will START or STAY ON the medications listed below when I get home from the hospital:    oxyCODONE-acetaminophen 5 mg-325 mg oral tablet  -- 1 tab(s) by mouth every 6 hours, As Needed -Moderate Pain MDD:4  tabs  -- Indication: For prn pain

## 2017-11-13 PROBLEM — Z00.00 ENCOUNTER FOR PREVENTIVE HEALTH EXAMINATION: Status: ACTIVE | Noted: 2017-11-13

## 2017-11-16 ENCOUNTER — APPOINTMENT (OUTPATIENT)
Dept: SURGERY | Facility: CLINIC | Age: 32
End: 2017-11-16
Payer: SELF-PAY

## 2017-11-16 VITALS
DIASTOLIC BLOOD PRESSURE: 80 MMHG | WEIGHT: 164 LBS | HEIGHT: 65 IN | TEMPERATURE: 98.3 F | BODY MASS INDEX: 27.32 KG/M2 | SYSTOLIC BLOOD PRESSURE: 131 MMHG | HEART RATE: 64 BPM

## 2017-11-16 DIAGNOSIS — K35.3 ACUTE APPENDICITIS WITH LOCALIZED PERITONITIS: ICD-10-CM

## 2017-11-16 DIAGNOSIS — Z78.9 OTHER SPECIFIED HEALTH STATUS: ICD-10-CM

## 2017-11-16 PROCEDURE — 99024 POSTOP FOLLOW-UP VISIT: CPT

## 2017-11-17 PROBLEM — K35.3 ACUTE APPENDICITIS WITH PERITONITIS: Status: RESOLVED | Noted: 2017-11-17 | Resolved: 2017-11-17

## 2017-11-17 PROBLEM — Z78.9 NON-SMOKER: Status: ACTIVE | Noted: 2017-11-16

## 2017-12-06 ENCOUNTER — EMERGENCY (EMERGENCY)
Facility: HOSPITAL | Age: 32
LOS: 1 days | Discharge: HOME | End: 2017-12-06
Attending: EMERGENCY MEDICINE
Payer: SELF-PAY

## 2017-12-06 VITALS
DIASTOLIC BLOOD PRESSURE: 61 MMHG | SYSTOLIC BLOOD PRESSURE: 112 MMHG | HEART RATE: 63 BPM | RESPIRATION RATE: 16 BRPM | OXYGEN SATURATION: 98 % | TEMPERATURE: 98 F

## 2017-12-06 VITALS
SYSTOLIC BLOOD PRESSURE: 118 MMHG | OXYGEN SATURATION: 96 % | RESPIRATION RATE: 16 BRPM | TEMPERATURE: 98 F | DIASTOLIC BLOOD PRESSURE: 75 MMHG | HEART RATE: 62 BPM

## 2017-12-06 DIAGNOSIS — Z90.49 ACQUIRED ABSENCE OF OTHER SPECIFIED PARTS OF DIGESTIVE TRACT: Chronic | ICD-10-CM

## 2017-12-06 PROCEDURE — 99282 EMERGENCY DEPT VISIT SF MDM: CPT

## 2017-12-06 NOTE — ED PROVIDER NOTE - OBJECTIVE STATEMENT
33 y/o M pt s/p appendectomy on 11/1, presents to ED c/o irritation at suprapubic incision site w/ few drops of blood. Bleeding has stopped. No other complaints. NKDA.

## 2017-12-06 NOTE — ED ADULT NURSE NOTE - OBJECTIVE STATEMENT
Pt states that he is having bleeding on surgical site. pt had appendectomy 1 month ago. denies pain, nausea, vomiting, diarrhea. In ED, no bleeding in site. wound is crusty, and yellow.

## 2018-01-04 ENCOUNTER — EMERGENCY (EMERGENCY)
Facility: HOSPITAL | Age: 33
LOS: 1 days | Discharge: HOME | End: 2018-01-04
Attending: EMERGENCY MEDICINE
Payer: SELF-PAY

## 2018-01-04 VITALS
RESPIRATION RATE: 18 BRPM | WEIGHT: 164.91 LBS | HEIGHT: 65 IN | TEMPERATURE: 98 F | HEART RATE: 68 BPM | SYSTOLIC BLOOD PRESSURE: 124 MMHG | OXYGEN SATURATION: 97 % | DIASTOLIC BLOOD PRESSURE: 91 MMHG

## 2018-01-04 DIAGNOSIS — Z90.49 ACQUIRED ABSENCE OF OTHER SPECIFIED PARTS OF DIGESTIVE TRACT: Chronic | ICD-10-CM

## 2018-01-04 PROCEDURE — 12001 RPR S/N/AX/GEN/TRNK 2.5CM/<: CPT

## 2018-01-04 PROCEDURE — 90715 TDAP VACCINE 7 YRS/> IM: CPT

## 2018-01-04 PROCEDURE — 73140 X-RAY EXAM OF FINGER(S): CPT

## 2018-01-04 PROCEDURE — 99283 EMERGENCY DEPT VISIT LOW MDM: CPT | Mod: 25

## 2018-01-04 PROCEDURE — 90471 IMMUNIZATION ADMIN: CPT

## 2018-01-04 PROCEDURE — 73140 X-RAY EXAM OF FINGER(S): CPT | Mod: 26,RT

## 2018-01-04 RX ORDER — TETANUS TOXOID, REDUCED DIPHTHERIA TOXOID AND ACELLULAR PERTUSSIS VACCINE, ADSORBED 5; 2.5; 8; 8; 2.5 [IU]/.5ML; [IU]/.5ML; UG/.5ML; UG/.5ML; UG/.5ML
0.5 SUSPENSION INTRAMUSCULAR ONCE
Qty: 0 | Refills: 0 | Status: COMPLETED | OUTPATIENT
Start: 2018-01-04 | End: 2018-01-04

## 2018-01-04 RX ADMIN — TETANUS TOXOID, REDUCED DIPHTHERIA TOXOID AND ACELLULAR PERTUSSIS VACCINE, ADSORBED 0.5 MILLILITER(S): 5; 2.5; 8; 8; 2.5 SUSPENSION INTRAMUSCULAR at 10:58

## 2018-01-04 NOTE — ED PROVIDER NOTE - OBJECTIVE STATEMENT
33 y/o M pt w/ no PMHx and a PSHx of appendectomy  presents to the ED c/o laceration to the R index finger since today. Pt states that he hit his finger on the door of his house, cutting it.  Pt doesn't recall his last tetanus shot. Pt denies fever and any other complaints. NKDA.

## 2018-01-04 NOTE — ED PROVIDER NOTE - PHYSICAL EXAMINATION
SKIN: 2 cm laceration over the distal dorsum of the 2nd finger, tendon function and neurologic function intact.

## 2018-01-04 NOTE — ED PROVIDER NOTE - MEDICAL DECISION MAKING DETAILS
Finger laceration, no tendon/involvement. Cleaned well, repaired, sterile dressing and splint. Precautions reviewed.

## 2022-02-15 NOTE — ED ADULT TRIAGE NOTE - HEIGHT IN FEET
[facility]  PROGRESS NOTE     Patient Identification:  Name:  Lisa Velazquez  Age:  58 y.o.  Sex:  female  :  1963  MRN:  [id number]  Visit Number:  [id number]  ROOM: [facility]     Primary Care Provider:  Patricia Skelton APRN    Length of stay in inpatient status:  14    Subjective     Chief Compliant: Status post right hip fracture    History of Presenting Illness: 58-year-old female who is status post right hip fracture with intramedullary nail placement, history of acute blood loss anemia, diabetes mellitus, peripheral vascular disease, hypertension hyperlipidemia.      Participated with physical and occupational therapy on 2022: Performs bed mobility with modified independence; performs transfer activities to supervision to modified independent; utilizes wheelchair/front wheeled walker for bed to chair, sit to stand/stand to sit transfer; ambulated 320 feet, 180 feet, 140 feet with front wheeled walker and supervision with verbal/nonverbal cues; requires minimal to moderate assist for lower body dressing; set up assist to modified independence for self-feeding    Objective     Current Hospital Meds:ascorbic acid, 500 mg, Oral, Daily  aspirin, 81 mg, Oral, Daily  cetirizine, 10 mg, Oral, Daily  clopidogrel, 75 mg, Oral, Daily  enoxaparin, 40 mg, Subcutaneous, Nightly  guaiFENesin, 1,200 mg, Oral, Q12H  insulin aspart, 0-7 Units, Subcutaneous, TID AC  insulin detemir, 12 Units, Subcutaneous, Nightly  isosorbide mononitrate, 30 mg, Oral, Q24H  lisinopril, 2.5 mg, Oral, Q24H  multivitamin with minerals, 1 tablet, Oral, Daily  nystatin, , Topical, Q12H  rosuvastatin, 40 mg, Oral, Nightly  vitamin D3, 5,000 Units, Oral, Daily       ----------------------------------------------------------------------------------------------------------------------  Vital Signs:  Temp:  [97.5 °F (36.4 °C)-97.6 °F (36.4 °C)] 97.5 °F (36.4 °C)  Heart Rate:  [90-91] 90  Resp:  [16] 16  BP: (101-143)/(66-81)  101/66  SpO2:  [97 %-99 %] 97 %  on   ;   Device (Oxygen Therapy): room air  Body mass index is 25.22 kg/m².    Wt Readings from Last 3 Encounters:   02/01/22 73 kg (161 lb)     Intake & Output (last 3 days)       02/12 0701  02/13 0700 02/13 0701  02/14 0700 02/14 0701  02/15 0700 02/15 0701  02/16 0700    P.O. 880 1500 1720     Total Intake(mL/kg) 880 (12.1) 1500 (20.5) 1720 (23.6)     Net +880 +1500 +1720             Urine Unmeasured Occurrence 3 x 6 x 6 x         Diet Regular; Consistent Carbohydrate  ----------------------------------------------------------------------------------------------------------------------  Physical exam:  Constitutional:   No acute distress, patient is complaining of some neuropathy pain but does not want to get anything for it  HEENT: Normocephalic atraumatic  Neck: Supple   Cardiovascular: Regular rate and rhythm  Pulmonary/Chest: Clear to auscultation  Abdominal: Positive bowel sounds soft.   Musculoskeletal: Status post hip repair, wound ok  Neurological: No focal deficits  Skin: No rash     ----------------------------------------------------------------------------------------------------------------------    Last echocardiogram:    ----------------------------------------------------------------------------------------------------------------------  Results from last 7 days   Lab Units 02/08/22  1459   WBC 10*3/mm3 6.92   HEMOGLOBIN g/dL 11.1*   HEMATOCRIT % 34.7   MCV fL 91.8   MCHC g/dL 32.0   PLATELETS 10*3/mm3 203               Invalid input(s): PROTEstimated Creatinine Clearance: 114.9 mL/min (by C-G formula based on SCr of 0.63 mg/dL).  No results found for: AMMONIA              Glucose   Date/Time Value Ref Range Status   02/15/2022 0636 155 (H) 70 - 130 mg/dL Final     Comment:     Meter: [id number] : [id number] Sivakumar BERGER   02/14/2022 1630 278 (H) 70 - 130 mg/dL Final     Comment:     Meter: [id number] : [id number] Bryon Jean   02/14/2022 1129 260 (H)  70 - 130 mg/dL Final     Comment:     Meter: [id number] : [id number] JANELLE CARO   02/14/2022 0642 156 (H) 70 - 130 mg/dL Final     Comment:     Meter: [id number] : [id number] Sivakumar Martinez C   02/13/2022 2021 212 (H) 70 - 130 mg/dL Final     Comment:     Meter: [id number] : [id number] Sivakumar Martinez C   02/13/2022 1612 211 (H) 70 - 130 mg/dL Final     Comment:     Meter: [id number] : [id number] Bryon Miranda   02/13/2022 1104 213 (H) 70 - 130 mg/dL Final     Comment:     Meter: [id number] : [id number] Bryon Miranda   02/13/2022 0611 144 (H) 70 - 130 mg/dL Final     Comment:     Meter: [id number] : [id number] Shabbir Mesha     No results found for: TSH, FREET4  No results found for: PREGTESTUR, PREGSERUM, HCG, HCGQUANT  Pain Management Panel    There is no flowsheet data to display.       Brief Urine Lab Results     None        No results found for: BLOODCX      No results found for: URINECX  No results found for: WOUNDCX  No results found for: STOOLCX        I have personally looked at the labs and they are summarized above.  ----------------------------------------------------------------------------------------------------------------------  Detailed radiology reports for the last 24 hours:    Imaging Results (Last 24 Hours)     ** No results found for the last 24 hours. **        Final impressions for the last 30 days of radiology reports:    No radiology results for the last 30 days.  I have personally looked at the radiology images and read the final radiology report.    Assessment & Plan      Status post right intertrochanteric hip fracture with intramedullary nail placement--Participated with physical and occupational therapy on 2/14/2022: Performs bed mobility with modified independence; performs transfer activities to supervision to modified independent; utilizes wheelchair/front wheeled walker for bed to chair, sit to stand/stand to sit transfer; ambulated 320 feet, 180 feet, 140  5 feet with front wheeled walker and supervision with verbal/nonverbal cues; requires minimal to moderate assist for lower body dressing; set up assist to modified independence for self-feeding    Recent acute blood loss anemia--stable    Peripheral vascular disease continue medical management    Hypertension controlled    hyperlipidemia continue statin therapy    Gastric erosions--patient was treated with full course of Protonix and treatment for H. pylori prior to coming here.        VTE Prophylaxis:   Mechanical Order History:     None      Pharmalogical Order History:      Ordered     Dose Route Frequency Stop    02/01/22 9996  enoxaparin (LOVENOX) syringe 40 mg         40 mg SC Nightly --              Alejandrina Ann MD  02/15/22  11:30 EST

## 2023-08-21 NOTE — ED PROVIDER NOTE - CROS ED CONS ALL NEG
- - -
Bed/Stretcher in lowest position, wheels locked, appropriate side rails in place/Call bell, personal items and telephone in reach/Instruct patient to call for assistance before getting out of bed/chair/stretcher/Non-slip footwear applied when patient is off stretcher/[place] to call system/Physically safe environment - no spills, clutter or unnecessary equipment/Purposeful proactive rounding/Room/bathroom lighting operational, light cord in reach

## 2025-06-05 NOTE — PATIENT PROFILE ADULT. - NS PRO OT REFERRAL QUES 1 YN
PRE-SEDATION ASSESSMENT    CONSENT  Risks, benefits, and alternatives have been discussed with the patient/patient representative, and patient/patient representative agrees to proceed: Yes    MEDICAL HISTORY  Significant medical/surgical history: Yes    PHYSICAL EXAM  History and Physical Reviewed: Patient has valid H&P within 30 days. I have reviewed and there are no changes.  Airway Risk History: No previous complications  Airway Anatomy : Class II  Heart : Normal  Lungs : Normal  LOC/Mental Status : Normal    OTHER FINDINGS  Reviewed current medications and allergies: Yes  Pertinent lab/diagnostic test reviewed: Yes    SEDATION RISK ASSESSMENT  Risk Status ASA: Class III - Severe systemic disease, limits activity, is not incapacitated  Plan for Sedation: Moderate Sedation  Indications for Procedure/Pre-Procedure Diagnosis and Planned Procedure: bone marrow  EKG Monitoring: Yes    NARRATIVE FINDINGS     
no